# Patient Record
Sex: FEMALE | Race: WHITE | HISPANIC OR LATINO | URBAN - METROPOLITAN AREA
[De-identification: names, ages, dates, MRNs, and addresses within clinical notes are randomized per-mention and may not be internally consistent; named-entity substitution may affect disease eponyms.]

---

## 2022-04-22 ENCOUNTER — HOSPITAL ENCOUNTER (EMERGENCY)
Facility: OTHER | Age: 22
Discharge: HOME | End: 2022-04-22
Attending: EMERGENCY MEDICINE

## 2022-04-22 VITALS
SYSTOLIC BLOOD PRESSURE: 97 MMHG | DIASTOLIC BLOOD PRESSURE: 60 MMHG | HEART RATE: 70 BPM | TEMPERATURE: 98 F | OXYGEN SATURATION: 100 % | RESPIRATION RATE: 16 BRPM

## 2022-04-22 DIAGNOSIS — O21.0 MORNING SICKNESS: ICD-10-CM

## 2022-04-22 DIAGNOSIS — R10.9 ABDOMINAL PAIN AFFECTING PREGNANCY: Primary | ICD-10-CM

## 2022-04-22 DIAGNOSIS — O26.899 ABDOMINAL PAIN AFFECTING PREGNANCY: Primary | ICD-10-CM

## 2022-04-22 LAB
ABO + RH BLD: NORMAL
ALBUMIN SERPL BCP-MCNC: 4.3 G/DL (ref 3.5–5.2)
ALP SERPL-CCNC: 79 U/L (ref 55–135)
ALT SERPL W/O P-5'-P-CCNC: 12 U/L (ref 10–44)
ANION GAP SERPL CALC-SCNC: 12 MMOL/L (ref 8–16)
AST SERPL-CCNC: 14 U/L (ref 10–40)
B-HCG UR QL: POSITIVE
BASOPHILS # BLD AUTO: 0.04 K/UL (ref 0–0.2)
BASOPHILS NFR BLD: 0.6 % (ref 0–1.9)
BILIRUB SERPL-MCNC: 0.6 MG/DL (ref 0.1–1)
BUN SERPL-MCNC: 7 MG/DL (ref 6–20)
CALCIUM SERPL-MCNC: 9.3 MG/DL (ref 8.7–10.5)
CHLORIDE SERPL-SCNC: 106 MMOL/L (ref 95–110)
CO2 SERPL-SCNC: 20 MMOL/L (ref 23–29)
CREAT SERPL-MCNC: 0.6 MG/DL (ref 0.5–1.4)
CTP QC/QA: YES
DIFFERENTIAL METHOD: ABNORMAL
EOSINOPHIL # BLD AUTO: 0 K/UL (ref 0–0.5)
EOSINOPHIL NFR BLD: 0.4 % (ref 0–8)
ERYTHROCYTE [DISTWIDTH] IN BLOOD BY AUTOMATED COUNT: 12.1 % (ref 11.5–14.5)
EST. GFR  (AFRICAN AMERICAN): >60 ML/MIN/1.73 M^2
EST. GFR  (NON AFRICAN AMERICAN): >60 ML/MIN/1.73 M^2
GLUCOSE SERPL-MCNC: 82 MG/DL (ref 70–110)
HCG INTACT+B SERPL-ACNC: NORMAL MIU/ML
HCT VFR BLD AUTO: 37.4 % (ref 37–48.5)
HGB BLD-MCNC: 12.9 G/DL (ref 12–16)
IMM GRANULOCYTES # BLD AUTO: 0.01 K/UL (ref 0–0.04)
IMM GRANULOCYTES NFR BLD AUTO: 0.1 % (ref 0–0.5)
LIPASE SERPL-CCNC: 18 U/L (ref 4–60)
LYMPHOCYTES # BLD AUTO: 2 K/UL (ref 1–4.8)
LYMPHOCYTES NFR BLD: 27.5 % (ref 18–48)
MCH RBC QN AUTO: 33.1 PG (ref 27–31)
MCHC RBC AUTO-ENTMCNC: 34.5 G/DL (ref 32–36)
MCV RBC AUTO: 96 FL (ref 82–98)
MONOCYTES # BLD AUTO: 0.4 K/UL (ref 0.3–1)
MONOCYTES NFR BLD: 5.5 % (ref 4–15)
NEUTROPHILS # BLD AUTO: 4.7 K/UL (ref 1.8–7.7)
NEUTROPHILS NFR BLD: 65.9 % (ref 38–73)
NRBC BLD-RTO: 0 /100 WBC
PLATELET # BLD AUTO: 319 K/UL (ref 150–450)
PMV BLD AUTO: 8.8 FL (ref 9.2–12.9)
POTASSIUM SERPL-SCNC: 3.5 MMOL/L (ref 3.5–5.1)
PROT SERPL-MCNC: 7.6 G/DL (ref 6–8.4)
RBC # BLD AUTO: 3.9 M/UL (ref 4–5.4)
SODIUM SERPL-SCNC: 138 MMOL/L (ref 136–145)
WBC # BLD AUTO: 7.12 K/UL (ref 3.9–12.7)

## 2022-04-22 PROCEDURE — 86901 BLOOD TYPING SEROLOGIC RH(D): CPT | Performed by: NURSE PRACTITIONER

## 2022-04-22 PROCEDURE — 99285 EMERGENCY DEPT VISIT HI MDM: CPT | Mod: 25

## 2022-04-22 PROCEDURE — 96374 THER/PROPH/DIAG INJ IV PUSH: CPT

## 2022-04-22 PROCEDURE — 84702 CHORIONIC GONADOTROPIN TEST: CPT | Performed by: EMERGENCY MEDICINE

## 2022-04-22 PROCEDURE — 96361 HYDRATE IV INFUSION ADD-ON: CPT

## 2022-04-22 PROCEDURE — 80053 COMPREHEN METABOLIC PANEL: CPT | Performed by: EMERGENCY MEDICINE

## 2022-04-22 PROCEDURE — 83690 ASSAY OF LIPASE: CPT | Performed by: EMERGENCY MEDICINE

## 2022-04-22 PROCEDURE — 81025 URINE PREGNANCY TEST: CPT | Performed by: EMERGENCY MEDICINE

## 2022-04-22 PROCEDURE — 63600175 PHARM REV CODE 636 W HCPCS: Performed by: NURSE PRACTITIONER

## 2022-04-22 PROCEDURE — 85025 COMPLETE CBC W/AUTO DIFF WBC: CPT | Performed by: EMERGENCY MEDICINE

## 2022-04-22 RX ORDER — FAMOTIDINE 20 MG
1 TABLET ORAL DAILY
Qty: 30 TABLET | Refills: 0 | Status: SHIPPED | OUTPATIENT
Start: 2022-04-22 | End: 2023-04-22

## 2022-04-22 RX ORDER — METOCLOPRAMIDE HYDROCHLORIDE 5 MG/ML
10 INJECTION INTRAMUSCULAR; INTRAVENOUS
Status: COMPLETED | OUTPATIENT
Start: 2022-04-22 | End: 2022-04-22

## 2022-04-22 RX ORDER — METOCLOPRAMIDE 10 MG/1
10 TABLET ORAL EVERY 6 HOURS
Qty: 30 TABLET | Refills: 0 | Status: SHIPPED | OUTPATIENT
Start: 2022-04-22

## 2022-04-22 RX ADMIN — SODIUM CHLORIDE, SODIUM LACTATE, POTASSIUM CHLORIDE, AND CALCIUM CHLORIDE 1000 ML: .6; .31; .03; .02 INJECTION, SOLUTION INTRAVENOUS at 11:04

## 2022-04-22 RX ADMIN — METOCLOPRAMIDE 10 MG: 5 INJECTION, SOLUTION INTRAMUSCULAR; INTRAVENOUS at 11:04

## 2022-04-22 NOTE — ED TRIAGE NOTES
Chief Complaint   Patient presents with    Abdominal Pain     Intermittent abdominal pain x several days w/ nausea and vomiting.  Pt states she is approximately 1 month pregnant.  + home UPT.  .       Denies vaginal bleeding/spotting.  Pt queried; pt denies further complaints at this time.

## 2022-04-22 NOTE — ED PROVIDER NOTES
Source of History:  Patient    Chief complaint:  Abdominal Pain (Intermittent abdominal pain x several days w/ nausea and vomiting.  Pt states she is approximately 1 month pregnant.  + home UPT.  .  )      HPI:  Niraj Marcial is a 21 y.o. female presenting with c/o complain intermittent upper abdominal pain, nausea vomit in that began multiple days ago.  Patient reports she is approximately 1 month pregnant had a positive UPT at home home, has not seen OBGYN, is not taking any prenatal vitamins.  She denies any lower abdominal pain or any vaginal bleeding    This is the extent to the patients complaints today here in the emergency department.    ROS: As per HPI and below:  Constitutional: No fever.  No chills.  Eyes: No visual changes.  ENT: No sore throat. No ear pain    Cardiovascular: No chest pain.  Respiratory: No shortness of breath.  GI:  Abdominal cramping, nausea vomiting  Genitourinary:  No vaginal bleed  Neurologic: No headache. No focal weakness.  No numbness.  MSK: no back pain   Integument: No rashes or lesions.  Hematologic: No easy bruising.  Endocrine: No excessive thirst or urination.    Review of patient's allergies indicates:  No Known Allergies    PMH:  As per HPI and below:  No past medical history on file.  No past surgical history on file.         Physical Exam:    BP 97/60 (BP Location: Left arm, Patient Position: Lying)   Pulse 70   Temp 97.9 °F (36.6 °C) (Oral)   Resp 16   SpO2 100%   Vitals:    22 0938 22 1244 22 1358   BP: 104/60 (!) 89/54 97/60   Pulse: 70 66 70   Resp: 15 16 16   Temp: 97.9 °F (36.6 °C) 98.2 °F (36.8 °C) 97.9 °F (36.6 °C)   TempSrc: Oral Oral Oral   SpO2: 100% 100% 100%       Nurse's notes vitals reviewed  Constitutional: Patient alert and oriented well-developed well-nourished  Eyes:  Normal conjunctiva.  Extraocular muscles are intact.  ENT: Oral mucosa moist  GI:  Nontender to palpation, bowel sounds are normal.  Musculoskeletal:  Normocephalic atraumatic, normal range of motion, no obvious deformities  Skin: Warm and dry no rashes or lesions, no ecchymosis, no erythema  Neuro: alert and oriented x3,  no focal neurological deficits.  Psych: Appropriate, conversant    Labs Reviewed   COMPREHENSIVE METABOLIC PANEL - Abnormal; Notable for the following components:       Result Value    CO2 20 (*)     All other components within normal limits    Narrative:     Release to patient->Immediate  Release to patient->Immediate   CBC W/ AUTO DIFFERENTIAL - Abnormal; Notable for the following components:    RBC 3.90 (*)     MCH 33.1 (*)     MPV 8.8 (*)     All other components within normal limits    Narrative:     Release to patient->Immediate  Release to patient->Immediate   POCT URINE PREGNANCY - Abnormal; Notable for the following components:    POC Preg Test, Ur Positive (*)     All other components within normal limits   LIPASE    Narrative:     Release to patient->Immediate  Release to patient->Immediate   HCG, QUANTITATIVE    Narrative:     Release to patient->Immediate  Release to patient->Immediate   GROUP & RH       US OB <14 Wks TransAbd & TransVag, Single Gestation (XPD)   Final Result      1. Single viable intrauterine pregnancy with estimated gestational age 6 weeks 2 days. and an ARTURO of 12/14/2022.         Electronically signed by: Christiano uS   Date:    04/22/2022   Time:    13:16            Initial Impression/ Differential Dx:  Hyperemesis, morning sickness, electrolyte imbalances, dehydrate      MDM:    21 y.o. female with nausea, vomiting with abdominal cramping times multiple days.  Patient approximately 4 months weeks pregnant.  Labs grossly unremarkable.  Treated with Reglan and fluids emergency department reported significant improvement symptoms.  Since patient was go also complaining of abdominal pain ultrasound was obtained to confirm a IUP.  Patient is 6 weeks 2 days pregnant.  OBGYN referral was placed for the patient will  discharge home with Reglan and prenatal vitamins.  I did discuss if her symptoms worsen she is in her the concerns she can return to emergency department for re-evaluation.  She stated under         Diagnostic Impression:    1. Abdominal pain affecting pregnancy    2. Morning sickness         ED Disposition Condition    Discharge Stable          ED Prescriptions     Medication Sig Dispense Start Date End Date Auth. Provider    prenatal 21-iron fu-folic acid (PRENATAL COMPLETE) 14 mg iron- 400 mcg Tab Take 1 tablet by mouth once daily. 30 tablet 4/22/2022 4/22/2023 LUIS Mcdaniels    metoclopramide HCl (REGLAN) 10 MG tablet Take 1 tablet (10 mg total) by mouth every 6 (six) hours. 30 tablet 4/22/2022  LUIS Mcdaniels        Follow-up Information     Follow up With Specialties Details Why Contact Info    [facility] Emergency Dept Emergency Medicine Go to  [facility] symptoms worsen [id number] [facility] [address]  [phone]           LUIS Mcdaniels  04/22/22 [id number]